# Patient Record
Sex: FEMALE | Race: WHITE | URBAN - METROPOLITAN AREA
[De-identification: names, ages, dates, MRNs, and addresses within clinical notes are randomized per-mention and may not be internally consistent; named-entity substitution may affect disease eponyms.]

---

## 2017-05-06 DIAGNOSIS — E78.5 HYPERLIPIDEMIA, UNSPECIFIED HYPERLIPIDEMIA TYPE: ICD-10-CM

## 2017-05-06 DIAGNOSIS — E55.9 VITAMIN D DEFICIENCY: ICD-10-CM

## 2017-05-06 DIAGNOSIS — I10 HTN (HYPERTENSION), BENIGN: ICD-10-CM

## 2017-05-06 DIAGNOSIS — E11.9 TYPE 2 DIABETES MELLITUS WITHOUT COMPLICATION, WITH LONG-TERM CURRENT USE OF INSULIN (HCC): ICD-10-CM

## 2017-05-06 DIAGNOSIS — Z79.4 TYPE 2 DIABETES MELLITUS WITHOUT COMPLICATION, WITH LONG-TERM CURRENT USE OF INSULIN (HCC): ICD-10-CM

## 2017-05-06 DIAGNOSIS — K21.9 GASTROESOPHAGEAL REFLUX DISEASE WITHOUT ESOPHAGITIS: ICD-10-CM

## 2017-05-08 RX ORDER — ROSUVASTATIN CALCIUM 20 MG/1
TABLET, COATED ORAL
Qty: 30 TAB | Refills: 5 | OUTPATIENT
Start: 2017-05-08

## 2017-05-08 RX ORDER — METFORMIN HYDROCHLORIDE 500 MG/1
TABLET ORAL
Qty: 60 TAB | Refills: 5 | OUTPATIENT
Start: 2017-05-08

## 2017-05-08 RX ORDER — BISOPROLOL FUMARATE AND HYDROCHLOROTHIAZIDE 5; 6.25 MG/1; MG/1
TABLET ORAL
Qty: 60 TAB | Refills: 5 | OUTPATIENT
Start: 2017-05-08

## 2017-05-08 RX ORDER — LOSARTAN POTASSIUM 100 MG/1
TABLET ORAL
Qty: 30 TAB | Refills: 5 | OUTPATIENT
Start: 2017-05-08

## 2017-05-08 NOTE — TELEPHONE ENCOUNTER
Can you let pt know its time for an office visit. It has been 6 months. If she needs any meds to last her until her next visit, let me know.

## 2017-05-08 NOTE — TELEPHONE ENCOUNTER
Spoke with Ms Debbie Dhaliwal to advised that she needs a [facility] before doing a full refill. She has made an appt for 6/1/17 w/Ruthie. Can these medications be refilled for another 30 days. Mrs Debbie Dhaliwal stated that they just lost a 9 wk old granddaughter. She went to sleep and did not wake up. Mrs Debbie Dhaliwal has been out of work dealing with this lost.  She would very much appreciate a temporary refill.

## 2017-05-10 RX ORDER — RABEPRAZOLE SODIUM 20 MG/1
TABLET, DELAYED RELEASE ORAL
Qty: 30 TAB | Refills: 0 | Status: SHIPPED | OUTPATIENT
Start: 2017-05-10 | End: 2017-06-08 | Stop reason: SDUPTHER

## 2017-05-10 RX ORDER — BISOPROLOL FUMARATE AND HYDROCHLOROTHIAZIDE 5; 6.25 MG/1; MG/1
TABLET ORAL
Qty: 60 TAB | Refills: 0 | Status: SHIPPED | OUTPATIENT
Start: 2017-05-10 | End: 2017-06-08 | Stop reason: SDUPTHER

## 2017-05-10 RX ORDER — METFORMIN HYDROCHLORIDE 500 MG/1
TABLET ORAL
Qty: 60 TAB | Refills: 0 | Status: SHIPPED | OUTPATIENT
Start: 2017-05-10 | End: 2017-06-08 | Stop reason: SDUPTHER

## 2017-05-10 RX ORDER — ERGOCALCIFEROL 1.25 MG/1
50000 CAPSULE ORAL
Qty: 12 CAP | Refills: 0 | Status: SHIPPED | OUTPATIENT
Start: 2017-05-10

## 2017-05-10 RX ORDER — MOMETASONE FUROATE 50 UG/1
2 SPRAY, METERED NASAL DAILY
Qty: 1 CONTAINER | Refills: 0 | Status: SHIPPED | OUTPATIENT
Start: 2017-05-10 | End: 2017-12-18 | Stop reason: SDUPTHER

## 2017-05-10 RX ORDER — ROSUVASTATIN CALCIUM 20 MG/1
TABLET, COATED ORAL
Qty: 30 TAB | Refills: 0 | Status: SHIPPED | OUTPATIENT
Start: 2017-05-10 | End: 2017-06-08 | Stop reason: SDUPTHER

## 2017-05-10 RX ORDER — LOSARTAN POTASSIUM 100 MG/1
TABLET ORAL
Qty: 30 TAB | Refills: 0 | Status: SHIPPED | OUTPATIENT
Start: 2017-05-10 | End: 2017-06-08 | Stop reason: SDUPTHER

## 2017-05-30 DIAGNOSIS — E11.9 TYPE 2 DIABETES MELLITUS WITHOUT COMPLICATION, WITH LONG-TERM CURRENT USE OF INSULIN (HCC): ICD-10-CM

## 2017-05-30 DIAGNOSIS — E78.5 HYPERLIPIDEMIA, UNSPECIFIED HYPERLIPIDEMIA TYPE: ICD-10-CM

## 2017-05-30 DIAGNOSIS — Z79.4 TYPE 2 DIABETES MELLITUS WITHOUT COMPLICATION, WITH LONG-TERM CURRENT USE OF INSULIN (HCC): ICD-10-CM

## 2017-05-30 DIAGNOSIS — I10 HTN (HYPERTENSION), BENIGN: ICD-10-CM

## 2017-05-30 RX ORDER — METFORMIN HYDROCHLORIDE 500 MG/1
TABLET ORAL
Qty: 60 TAB | Refills: 0 | Status: CANCELLED | OUTPATIENT
Start: 2017-05-30

## 2017-05-30 RX ORDER — BISOPROLOL FUMARATE AND HYDROCHLOROTHIAZIDE 5; 6.25 MG/1; MG/1
TABLET ORAL
Qty: 60 TAB | Refills: 0 | Status: CANCELLED | OUTPATIENT
Start: 2017-05-30

## 2017-05-30 RX ORDER — LOSARTAN POTASSIUM 100 MG/1
TABLET ORAL
Qty: 30 TAB | Refills: 0 | Status: CANCELLED | OUTPATIENT
Start: 2017-05-30

## 2017-05-30 RX ORDER — ROSUVASTATIN CALCIUM 20 MG/1
TABLET, COATED ORAL
Qty: 30 TAB | Refills: 0 | Status: CANCELLED | OUTPATIENT
Start: 2017-05-30

## 2017-05-30 NOTE — TELEPHONE ENCOUNTER
Pt had to r/s her appt to 6/22/17 as her  had heart attack and triple bypass surgery and she doesn't want to leave him so soon. Pt will need refills before appt.

## 2017-05-31 NOTE — TELEPHONE ENCOUNTER
I refilled her meds for a month on 5/6 and said she was due for an appt. She scheduled for 6/1. Now the appt is change to 6/30. I need to see her for monitoring of her conditions.

## 2017-06-08 ENCOUNTER — OFFICE VISIT (OUTPATIENT)
Dept: INTERNAL MEDICINE CLINIC | Age: 67
End: 2017-06-08

## 2017-06-08 VITALS
WEIGHT: 208 LBS | RESPIRATION RATE: 18 BRPM | HEIGHT: 65 IN | HEART RATE: 84 BPM | OXYGEN SATURATION: 95 % | TEMPERATURE: 98.3 F | DIASTOLIC BLOOD PRESSURE: 76 MMHG | SYSTOLIC BLOOD PRESSURE: 136 MMHG | BODY MASS INDEX: 34.66 KG/M2

## 2017-06-08 DIAGNOSIS — K21.9 GASTROESOPHAGEAL REFLUX DISEASE WITHOUT ESOPHAGITIS: ICD-10-CM

## 2017-06-08 DIAGNOSIS — Z79.4 TYPE 2 DIABETES MELLITUS WITHOUT COMPLICATION, WITH LONG-TERM CURRENT USE OF INSULIN (HCC): Primary | ICD-10-CM

## 2017-06-08 DIAGNOSIS — E78.5 HYPERLIPIDEMIA, UNSPECIFIED HYPERLIPIDEMIA TYPE: ICD-10-CM

## 2017-06-08 DIAGNOSIS — R82.998 LEUKOCYTES IN URINE: ICD-10-CM

## 2017-06-08 DIAGNOSIS — R31.9 BLOOD IN URINE: ICD-10-CM

## 2017-06-08 DIAGNOSIS — E11.9 TYPE 2 DIABETES MELLITUS WITHOUT COMPLICATION, WITH LONG-TERM CURRENT USE OF INSULIN (HCC): Primary | ICD-10-CM

## 2017-06-08 DIAGNOSIS — E55.9 VITAMIN D DEFICIENCY: ICD-10-CM

## 2017-06-08 DIAGNOSIS — I10 HTN (HYPERTENSION), BENIGN: ICD-10-CM

## 2017-06-08 LAB
BILIRUB UR QL STRIP: NORMAL
CREATININE(CRT),U, 723280: 300 MG/DL
GLUCOSE UR-MCNC: NEGATIVE MG/DL
KETONES P FAST UR STRIP-MCNC: NEGATIVE MG/DL
MICROALBUMIN UR TEST STR-MCNC: 80 MG/L
MICROALBUMIN/CREAT RATIO POC: NORMAL MG/G
PH UR STRIP: 5.5 [PH] (ref 4.6–8)
PROT UR QL STRIP: NORMAL MG/DL
SP GR UR STRIP: 1.03 (ref 1–1.03)
UA UROBILINOGEN AMB POC: NORMAL (ref 0.2–1)
URINALYSIS CLARITY POC: CLEAR
URINALYSIS COLOR POC: YELLOW
URINE BLOOD POC: NORMAL
URINE LEUKOCYTES POC: NORMAL
URINE NITRITES POC: NEGATIVE

## 2017-06-08 RX ORDER — METFORMIN HYDROCHLORIDE 500 MG/1
TABLET ORAL
Qty: 60 TAB | Refills: 5 | Status: SHIPPED | OUTPATIENT
Start: 2017-06-08 | End: 2017-12-18 | Stop reason: SDUPTHER

## 2017-06-08 RX ORDER — ROSUVASTATIN CALCIUM 20 MG/1
TABLET, COATED ORAL
Qty: 30 TAB | Refills: 5 | Status: SHIPPED | OUTPATIENT
Start: 2017-06-08 | End: 2017-12-18 | Stop reason: SDUPTHER

## 2017-06-08 RX ORDER — LOSARTAN POTASSIUM 100 MG/1
TABLET ORAL
Qty: 30 TAB | Refills: 5 | Status: SHIPPED | OUTPATIENT
Start: 2017-06-08 | End: 2017-11-27 | Stop reason: SDUPTHER

## 2017-06-08 RX ORDER — RABEPRAZOLE SODIUM 20 MG/1
TABLET, DELAYED RELEASE ORAL
Qty: 30 TAB | Refills: 5 | Status: SHIPPED | OUTPATIENT
Start: 2017-06-08 | End: 2017-12-14 | Stop reason: SDUPTHER

## 2017-06-08 RX ORDER — ERGOCALCIFEROL 1.25 MG/1
50000 CAPSULE ORAL
Qty: 12 CAP | Refills: 5 | Status: CANCELLED | OUTPATIENT
Start: 2017-06-08

## 2017-06-08 RX ORDER — BISOPROLOL FUMARATE AND HYDROCHLOROTHIAZIDE 5; 6.25 MG/1; MG/1
TABLET ORAL
Qty: 60 TAB | Refills: 5 | Status: SHIPPED | OUTPATIENT
Start: 2017-06-08 | End: 2017-12-18 | Stop reason: SDUPTHER

## 2017-06-08 NOTE — PATIENT INSTRUCTIONS
Noninsulin Medicines for Type 2 Diabetes: Care Instructions  Your Care Instructions  There are different types of noninsulin medicines for diabetes. Each works in a different way to help you control your blood sugar. Some types help your body make insulin to lower your blood sugar. Others lower how much insulin your body needs. Some can slow how quickly your body digests sugars. And some can remove extra glucose through your urine. Some of these medicines are:  · Alpha-glucosidase inhibitors. These keep starches from breaking down. This means that they lower the amount of glucose absorbed when you eat. They do not help your body make more insulin. So they will not cause low blood sugar unless they are used with other medicines for diabetes. They include acarbose and miglitol. · DPP-4 inhibitors. These help your body increase the level of insulin after eating. They also help your body make less of a hormone that raises blood sugar. They include linagliptin, saxagliptin, and sitagliptin. · Incretin hormones (GLP-1 receptor agonists). Your body makes a protein that can raise your insulin level, lower your blood sugar, and make you less hungry. You can inject hormone medicines that work the same way as this protein. They include exenatide and liraglutide. · Meglitinides. These help your body release insulin. They also help slow how your body digests sugars. In this way, they prevent your blood sugar from rising too fast after you eat. They include nateglinide and repaglinide. · Metformin. This lowers how much glucose your liver makes. And it helps you respond better to insulin. It also lowers the amount of stored sugar that your liver releases when you are not eating. · Sodium glucose co-transporter 2 inhibitors (SGLT2 inhibitors). These help to remove extra glucose through your urine. They may also help some people lose weight. They include canagliflozin, dapagliflozin, and empagliflozin. · Sulfonylureas. These help your body release more insulin. Some work for many hours and can cause low blood sugar if you don't eat as you expected. They include glipizide and glyburide. · Thiazolidinediones. These reduce the amount of blood glucose. They also help you respond better to insulin. They include pioglitazone and rosiglitazone. You may need to take more than one medicine for diabetes. Two or more medicines may work better to lower your blood sugar level than just one medicine. Follow-up care is a key part of your treatment and safety. Be sure to make and go to all appointments, and call your doctor if you are having problems. It's also a good idea to know your test results and keep a list of the medicines you take. How can you care for yourself at home? · Eat a healthy diet. Get some exercise each day. This may help you to reduce how much medicine you need. · Do not take other prescription or over-the-counter medicines, vitamins, herbal products, or supplements without talking to your doctor first. Some medicines for type 2 diabetes can cause problems with other medicines or supplements. · Tell your doctor if you plan to get pregnant. Some of these drugs are not safe for pregnant women. · Be safe with medicines. Take your medicines exactly as prescribed. Meglitinides or sulfonylureas can cause your blood sugar to drop very low. Call your doctor if you think you are having a problem with your medicine. · Check your blood sugar levels often. You can use a glucose monitor. Keeping track can help you know how certain foods, activities, and medicines affect your blood sugar. And it can help you keep your blood sugar from getting so low that it's not safe. When should you call for help? Call 911 anytime you think you may need emergency care. For example, call if:  · You passed out (lost consciousness), or you suddenly become very sleepy or confused.  (You may have very low blood sugar.)  · You have symptoms of high blood sugar, such as:  ¨ Blurred vision. ¨ Trouble staying awake or being woken up. ¨ Fast, deep breathing. ¨ Breath that smells fruity. ¨ Belly pain, not feeling hungry, and vomiting. ¨ Feeling confused. Call your doctor now or seek immediate medical care if:  · You are sick and cannot control your blood sugar. · You have been vomiting or have had diarrhea for more than 6 hours. · Your blood sugar stays higher than the level your doctor has set for you. · You have symptoms of low blood sugar, such as:  ¨ Sweating. ¨ Feeling nervous, shaky, and weak. ¨ Extreme hunger and slight nausea. ¨ Dizziness and headache. ¨ Blurred vision. ¨ Confusion. Watch closely for changes in your health, and be sure to contact your doctor if:  · You have a hard time knowing when your blood sugar is low. · You have trouble keeping your blood sugar in the target range. · You often have problems controlling your blood sugar. · You have symptoms of long-term diabetes problems, such as:  ¨ New vision changes. ¨ New pain, numbness, or tingling in your hands or feet. ¨ Skin problems. Where can you learn more? Go to http://gregoria-tiffanie.info/. Enter H153 in the search box to learn more about \"Noninsulin Medicines for Type 2 Diabetes: Care Instructions. \"  Current as of: August 3, 2016  Content Version: 11.2  © 2810-6893 [employer]. Care instructions adapted under license by [employer] (which disclaims liability or warranty for this information). If you have questions about a medical condition or this instruction, always ask your healthcare professional. [place] [address] any warranty or liability for your use of this information.

## 2017-06-08 NOTE — MR AVS SNAPSHOT
Visit Information Date & Time Provider Department Dept. Phone Encounter #  
 6/8/2017  8:30 AM Paige Harris PA-C [facility] [phone] [id number] Follow-up Instructions Return in about 6 months (around 12/8/2017) for Combo. Upcoming Health Maintenance Date Due  
 GLAUCOMA SCREENING Q2Y 6/30/2017* OSTEOPOROSIS SCREENING (DEXA) 6/30/2017* BREAST CANCER SCRN MAMMOGRAM 6/30/2017* EYE EXAM RETINAL OR DILATED Q1 6/30/2017* INFLUENZA AGE 9 TO ADULT 8/1/2017 LIPID PANEL Q1 10/17/2017 HEMOGLOBIN A1C Q6M 12/8/2017 FOOT EXAM Q1 6/8/2018 MICROALBUMIN Q1 6/8/2018 MEDICARE YEARLY EXAM 6/9/2018 COLONOSCOPY 10/31/2020 DTaP/Tdap/Td series (2 - Td) 10/10/2022 *Topic was postponed. The date shown is not the original due date. Allergies as of 6/8/2017  Review Complete On: 6/8/2017 By: Paige Harris PA-C No Known Allergies Current Immunizations  Reviewed on 10/10/2012 Name Date Influenza Vaccine 9/21/2015 Influenza Vaccine (Quad) PF 10/17/2016 Influenza Vaccine PF 10/18/2013  9:01 AM  
 Influenza Vaccine Split 10/10/2012, 12/8/2011, 10/26/2010 Pneumococcal Conjugate (PCV-13) 9/21/2015 Pneumococcal Polysaccharide (PPSV-23) 10/17/2016 TDAP Vaccine 10/10/2012 Not reviewed this visit You Were Diagnosed With   
  
 Codes Comments Leukocytes in urine    -  Primary ICD-10-CM: R82.99 
ICD-9-CM: 791.7 Type 2 diabetes mellitus without complication, with long-term current use of insulin (HCC)     ICD-10-CM: E11.9, Z79.4 ICD-9-CM: 250.00, V58.67 Hyperlipidemia, unspecified hyperlipidemia type     ICD-10-CM: E78.5 ICD-9-CM: 272.4 HTN (hypertension), benign     ICD-10-CM: I10 
ICD-9-CM: 401.1 Gastroesophageal reflux disease without esophagitis     ICD-10-CM: K21.9 ICD-9-CM: 530.81 Vitamin D deficiency     ICD-10-CM: E55.9 ICD-9-CM: 268.9 Vitals BP Pulse Temp Resp Height(growth percentile) Weight(growth percentile) 136/76 (BP 1 Location: Left arm, BP Patient Position: Sitting) 84 98.3 °F (36.8 °C) (Oral) 18 5' 5\" (1.651 m) 208 lb (94.3 kg) SpO2 BMI OB Status Smoking Status 95% 34.61 kg/m2 Postmenopausal Former Smoker BMI and BSA Data Body Mass Index Body Surface Area  
 34.61 kg/m 2 2.08 m 2 Preferred Pharmacy Pharmacy Name Phone [facility] [address] [place] [address] [address] [phone] Your Updated Medication List  
  
   
This list is accurate as of: 6/8/17  8:57 AM.  Always use your most recent med list.  
  
  
  
  
 albuterol 90 mcg/actuation inhaler Commonly known as:  PROVENTIL HFA, VENTOLIN HFA, PROAIR HFA Take 2 Puffs by inhalation every six (6) hours as needed for Wheezing. aspirin 81 mg tablet Take 81 mg by mouth. bisoprolol-hydroCHLOROthiazide 5-6.25 mg per tablet Commonly known as:  ZIAC  
take 1 tablet by mouth twice a day CALCIUM 600 + D 600-125 mg-unit Tab Generic drug:  calcium-cholecalciferol (d3) Take 2 Tabs by mouth daily. ergocalciferol 50,000 unit capsule Commonly known as:  ERGOCALCIFEROL Take 1 Cap by mouth every seven (7) days. hydrocortisone valerate 0.2 % topical cream  
Commonly known as:  [employer] Apply  to affected area two (2) times a day. use thin layer  
  
 losartan 100 mg tablet Commonly known as:  COZAAR  
take 1 tablet by mouth once daily  
  
 metFORMIN 500 mg tablet Commonly known as:  GLUCOPHAGE  
take 1 tablet by mouth twice a day with food  
  
 mometasone 50 mcg/actuation nasal spray Commonly known as:  NASONEX  
2 Sprays by Both Nostrils route daily. multivitamin tablet Commonly known as:  ONE A DAY Take 1 Tab by mouth daily. RABEprazole 20 mg tablet Commonly known as:  ACIPHEX  
take 1 tablet by mouth once daily  
  
 rosuvastatin 20 mg tablet Commonly known as:  CRESTOR  
take 1 tablet by mouth once daily Prescriptions Sent to Pharmacy Refills  
 rosuvastatin (CRESTOR) 20 mg tablet 5 Sig: take 1 tablet by mouth once daily Class: Normal  
 Pharmacy: [facility] [place] [address] [address] [facility] [address] Ph #: [phone]  
 losartan (COZAAR) 100 mg tablet 5 Sig: take 1 tablet by mouth once daily Class: Normal  
 Pharmacy: [facility] [place] [address] [address] [facility] [address] Ph #: [phone]  
 metFORMIN (GLUCOPHAGE) 500 mg tablet 5 Sig: take 1 tablet by mouth twice a day with food Class: Normal  
 Pharmacy: [address] Ph #: [phone] RABEprazole (ACIPHEX) 20 mg tablet 5 Sig: take 1 tablet by mouth once daily Class: Normal  
 Pharmacy: [facility] [facility] [address] Ph #: [phone]  
 bisoprolol-hydroCHLOROthiazide (ZIAC) 5-6.25 mg per tablet 5 Sig: take 1 tablet by mouth twice a day Class: Normal  
 Pharmacy: [address] Ph #: [phone] We Performed the Following AMB POC URINALYSIS DIP STICK AUTO W/O MICRO [77383 CPT(R)] AMB POC URINE, MICROALBUMIN, SEMIQUANTITATIVE [33348 CPT(R)]  DIABETES FOOT EXAM [7 Custom] [place] COLLECTION VENOUS BLOOD,VENIPUNCTURE [id number] CPT(R)] Follow-up Instructions Return in about 6 months (around 12/8/2017) for Combo. To-Do List   
 06/08/2017 Microbiology:  CULTURE, URINE   
  
 06/08/2017 Lab:  HEMOGLOBIN A1C WITH EAG   
  
 06/08/2017 Lab:  LIPID PANEL   
  
 06/08/2017 Lab:  METABOLIC PANEL, COMPREHENSIVE   
  
 06/08/2017 Lab:  VITAMIN D, 25 HYDROXY Introducing [facility]! Dear Luis Hdez: Thank you for requesting a [facility] account.   Our records indicate that you already have an active [employer] account. You can access your account anytime at [email or web address] Did you know that you can access your hospital and ER discharge instructions at any time in [employer]? You can also review all of your test results from your hospital stay or ER visit. Additional Information If you have questions, please visit the Frequently Asked Questions section of the [employer] website at [email or web address] Remember, [employer] is NOT to be used for urgent needs. For medical emergencies, dial 911. Now available from your iPhone and Android! Please provide this summary of care documentation to your next provider. Your primary care clinician is listed as Maura Mtz. If you have any questions after today's visit, please call [phone].

## 2017-06-08 NOTE — PROGRESS NOTES
HISTORY OF PRESENT ILLNESS  Екатерина Gregory is a 77 y.o. female. HPI Екатерина Gregory is here for follow up on diabetes, HTN and hypercholesterolemia. She has been trying to change her eating habits and lost 8 lbs since her last visit. She has been under increased stress - her  had a heart attack and she had a 11 week old granddaughter die from suspected SIDS. She herself is doing fine. She has no new complaints and states she feels well. Review of Systems   Constitutional: Negative. HENT: Negative. Eyes: Negative for blurred vision. Respiratory: Negative. Cardiovascular: Negative. Gastrointestinal: Negative for heartburn (has not had significant heartburn sx, has not been taking aciphex). Genitourinary: Negative. Neurological: Negative for tingling. Physical Exam   Constitutional: She is oriented to person, place, and time. She appears well-developed and well-nourished. No distress. HENT:   Head: Normocephalic and atraumatic. Eyes: Conjunctivae are normal.   Cardiovascular: Normal rate and regular rhythm. No murmur heard. Pulmonary/Chest: Effort normal and breath sounds normal. She has no wheezes. Musculoskeletal: She exhibits no edema. Neurological: She is alert and oriented to person, place, and time. Psychiatric: She has a normal mood and affect.  Her behavior is normal. Judgment and thought content normal.     Visit Vitals    /76 (BP 1 Location: Left arm, BP Patient Position: Sitting)    Pulse 84    Temp 98.3 °F (36.8 °C) (Oral)    Resp 18    Ht 5' 5\" (1.651 m)    Wt 208 lb (94.3 kg)    SpO2 95%    BMI 34.61 kg/m2     Wt Readings from Last 3 Encounters:   06/08/17 208 lb (94.3 kg)   10/17/16 216 lb (98 kg)   04/11/16 221 lb (100.2 kg)     Results for orders placed or performed in visit on 06/08/17   AMB POC URINALYSIS DIP STICK AUTO W/O MICRO   Result Value Ref Range    Color (UA POC) Yellow     Clarity (UA POC) Clear     Glucose (UA POC) Negative Negative    Bilirubin (UA POC) 1+ Negative    Ketones (UA POC) Negative Negative    Specific gravity (UA POC) 1.030 1.001 - 1.035    Blood (UA POC) 1+ Negative    pH (UA POC) 5.5 4.6 - 8.0    Protein (UA POC) 2+ Negative mg/dL    Urobilinogen (UA POC) 0.2 mg/dL 0.2 - 1    Nitrites (UA POC) Negative Negative    Leukocyte esterase (UA POC) 1+ Negative   AMB POC URINE, MICROALBUMIN, SEMIQUANTITATIVE   Result Value Ref Range    Microalbumin urine (POC) 80 mg/L    Microalbumin/creat ratio (POC)  mg/g    Creatinine(Crt), urine 300 mg/dL   Diabetic foot exam:     Left: Filament test normal sensation with micro filament   Pulse DP: 2+ (normal)   Pulse PT: 2+ (normal)   Deformities: None  Right: Filament test normal sensation with micro filament   Pulse DP: 2+ (normal)   Pulse PT: 2+ (normal)   Deformities: None          ASSESSMENT and PLAN    ICD-10-CM ICD-9-CM    1. Type 2 diabetes mellitus without complication, with long-term current use of insulin (HCC) E11.9 250.00 AMB POC URINALYSIS DIP STICK AUTO W/O MICRO    Z79.4 V58.67 AMB POC URINE, MICROALBUMIN, SEMIQUANTITATIVE      TX COLLECTION VENOUS BLOOD,VENIPUNCTURE      METABOLIC PANEL, COMPREHENSIVE      LIPID PANEL      HEMOGLOBIN A1C WITH EAG      HM DIABETES FOOT EXAM      metFORMIN (GLUCOPHAGE) 500 mg tablet      METABOLIC PANEL, COMPREHENSIVE      LIPID PANEL      HEMOGLOBIN A1C WITH EAG   2. Hyperlipidemia, unspecified hyperlipidemia type E78.5 272.4 rosuvastatin (CRESTOR) 20 mg tablet   3. HTN (hypertension), benign I10 401.1 losartan (COZAAR) 100 mg tablet      bisoprolol-hydroCHLOROthiazide (ZIAC) 5-6.25 mg per tablet   4. Gastroesophageal reflux disease without esophagitis K21.9 530.81 RABEprazole (ACIPHEX) 20 mg tablet   5. Vitamin D deficiency E55.9 268.9 VITAMIN D, 25 HYDROXY      VITAMIN D, 25 HYDROXY   6. Leukocytes in urine R82.99 791.7 CULTURE, URINE      CULTURE, URINE   7.  Blood in urine R31.9 599.70 There is not enough urine for the UA microscopic,  urine culture is negative, will have pt return for repeat UA     Pt verbalized understanding of their condition and diagnoses, treatment plan,  as well as side effects of any new medications prescribed.

## 2017-06-08 NOTE — PROGRESS NOTES
Chief Complaint   Patient presents with    Diabetes    Hypertension    Cholesterol Problem    Vitamin D Deficiency     1. Have you been to the ER, urgent care clinic since your last visit? Hospitalized since your last visit? No    2. Have you seen or consulted any other health care providers outside of the [facility] since your last visit? Include any pap smears or colon screening. No       Pt wished to schedule her own appt for mammogram she said she can just call also states she canceled her eye appt because she has to take care of her . Will reschedule when she gets a chance.

## 2017-06-10 LAB
25(OH)D3 SERPL-MCNC: 33.3 NG/ML (ref 32–100)
A-G RATIO,AGRAT: 2 RATIO (ref 1.1–2.6)
ALBUMIN SERPL-MCNC: 4.5 G/DL (ref 3.5–5)
ALP SERPL-CCNC: 78 U/L (ref 40–120)
ALT SERPL-CCNC: 27 U/L (ref 5–40)
ANION GAP SERPL CALC-SCNC: 17 MMOL/L
AST SERPL W P-5'-P-CCNC: 22 U/L (ref 10–37)
AVG GLU, 10930: 131 MG/DL (ref 91–123)
BILIRUB SERPL-MCNC: 0.6 MG/DL (ref 0.2–1.2)
BUN SERPL-MCNC: 10 MG/DL (ref 6–22)
CALCIUM SERPL-MCNC: 9.5 MG/DL (ref 8.4–10.5)
CHLORIDE SERPL-SCNC: 99 MMOL/L (ref 98–110)
CHOLEST SERPL-MCNC: 115 MG/DL (ref 110–200)
CO2 SERPL-SCNC: 22 MMOL/L (ref 20–32)
CREAT SERPL-MCNC: 0.7 MG/DL (ref 0.8–1.4)
CULTURE RESULT, SENTARA: NORMAL
GFRAA, 66117: >60
GFRNA, 66118: >60
GLOBULIN,GLOB: 2.2 G/DL (ref 2–4)
GLUCOSE SERPL-MCNC: 114 MG/DL (ref 65–99)
HBA1C MFR BLD HPLC: 6.2 % (ref 4.8–5.9)
HDLC SERPL-MCNC: 47 MG/DL (ref 40–59)
LDLC SERPL CALC-MCNC: 46 MG/DL (ref 50–99)
POTASSIUM SERPL-SCNC: 3.9 MMOL/L (ref 3.5–5.5)
PROT SERPL-MCNC: 6.7 G/DL (ref 6.2–8.1)
SODIUM SERPL-SCNC: 138 MMOL/L (ref 133–145)
TRIGL SERPL-MCNC: 110 MG/DL (ref 40–149)
VLDLC SERPL CALC-MCNC: 22 MG/DL (ref 8–30)

## 2017-11-27 DIAGNOSIS — I10 HTN (HYPERTENSION), BENIGN: ICD-10-CM

## 2017-11-27 RX ORDER — LOSARTAN POTASSIUM 100 MG/1
TABLET ORAL
Qty: 30 TAB | Refills: 0 | Status: SHIPPED | OUTPATIENT
Start: 2017-11-27 | End: 2017-12-18 | Stop reason: SDUPTHER

## 2017-12-02 DIAGNOSIS — E78.5 HYPERLIPIDEMIA, UNSPECIFIED HYPERLIPIDEMIA TYPE: ICD-10-CM

## 2017-12-02 DIAGNOSIS — Z79.4 TYPE 2 DIABETES MELLITUS WITHOUT COMPLICATION, WITH LONG-TERM CURRENT USE OF INSULIN (HCC): ICD-10-CM

## 2017-12-02 DIAGNOSIS — I10 HTN (HYPERTENSION), BENIGN: ICD-10-CM

## 2017-12-02 DIAGNOSIS — E11.9 TYPE 2 DIABETES MELLITUS WITHOUT COMPLICATION, WITH LONG-TERM CURRENT USE OF INSULIN (HCC): ICD-10-CM

## 2017-12-04 RX ORDER — METFORMIN HYDROCHLORIDE 500 MG/1
TABLET ORAL
Qty: 60 TAB | Refills: 5 | OUTPATIENT
Start: 2017-12-04

## 2017-12-04 RX ORDER — ROSUVASTATIN CALCIUM 20 MG/1
TABLET, COATED ORAL
Qty: 30 TAB | Refills: 5 | OUTPATIENT
Start: 2017-12-04

## 2017-12-04 RX ORDER — LOSARTAN POTASSIUM 100 MG/1
TABLET ORAL
Qty: 30 TAB | Refills: 0 | OUTPATIENT
Start: 2017-12-04

## 2017-12-04 RX ORDER — BISOPROLOL FUMARATE AND HYDROCHLOROTHIAZIDE 5; 6.25 MG/1; MG/1
TABLET ORAL
Qty: 60 TAB | Refills: 5 | OUTPATIENT
Start: 2017-12-04

## 2017-12-06 NOTE — TELEPHONE ENCOUNTER
Tried calling pt lm.  Sending pt a letter letting her know its time for her appt     Closing encounter

## 2017-12-14 ENCOUNTER — LAB ONLY (OUTPATIENT)
Dept: INTERNAL MEDICINE CLINIC | Age: 67
End: 2017-12-14

## 2017-12-14 DIAGNOSIS — I10 HTN (HYPERTENSION), BENIGN: ICD-10-CM

## 2017-12-14 DIAGNOSIS — E11.9 TYPE 2 DIABETES MELLITUS WITHOUT COMPLICATION, WITHOUT LONG-TERM CURRENT USE OF INSULIN (HCC): ICD-10-CM

## 2017-12-14 DIAGNOSIS — E78.5 HYPERLIPIDEMIA, UNSPECIFIED HYPERLIPIDEMIA TYPE: Primary | ICD-10-CM

## 2017-12-14 DIAGNOSIS — K21.9 GASTROESOPHAGEAL REFLUX DISEASE WITHOUT ESOPHAGITIS: ICD-10-CM

## 2017-12-14 DIAGNOSIS — E55.9 VITAMIN D DEFICIENCY: ICD-10-CM

## 2017-12-15 LAB
25(OH)D3 SERPL-MCNC: 37 NG/ML (ref 32–100)
A-G RATIO,AGRAT: 1.9 RATIO (ref 1.1–2.6)
ALBUMIN SERPL-MCNC: 4.4 G/DL (ref 3.5–5)
ALP SERPL-CCNC: 83 U/L (ref 40–120)
ALT SERPL-CCNC: 19 U/L (ref 5–40)
ANION GAP SERPL CALC-SCNC: 16 MMOL/L
AST SERPL W P-5'-P-CCNC: 22 U/L (ref 10–37)
AVG GLU, 10930: 130 MG/DL (ref 91–123)
BILIRUB SERPL-MCNC: 0.5 MG/DL (ref 0.2–1.2)
BUN SERPL-MCNC: 16 MG/DL (ref 6–22)
CALCIUM SERPL-MCNC: 9.6 MG/DL (ref 8.4–10.5)
CHLORIDE SERPL-SCNC: 98 MMOL/L (ref 98–110)
CHOLEST SERPL-MCNC: 130 MG/DL (ref 110–200)
CO2 SERPL-SCNC: 26 MMOL/L (ref 20–32)
CREAT SERPL-MCNC: 0.8 MG/DL (ref 0.8–1.4)
GFRAA, 66117: >60
GFRNA, 66118: >60
GLOBULIN,GLOB: 2.3 G/DL (ref 2–4)
GLUCOSE SERPL-MCNC: 110 MG/DL (ref 70–99)
HBA1C MFR BLD HPLC: 6.2 % (ref 4.8–5.9)
HDLC SERPL-MCNC: 54 MG/DL (ref 40–59)
LDLC SERPL CALC-MCNC: 57 MG/DL (ref 50–99)
POTASSIUM SERPL-SCNC: 4.4 MMOL/L (ref 3.5–5.5)
PROT SERPL-MCNC: 6.7 G/DL (ref 6.2–8.1)
SODIUM SERPL-SCNC: 140 MMOL/L (ref 133–145)
TRIGL SERPL-MCNC: 99 MG/DL (ref 40–149)
VLDLC SERPL CALC-MCNC: 20 MG/DL (ref 8–30)

## 2017-12-15 RX ORDER — RABEPRAZOLE SODIUM 20 MG/1
TABLET, DELAYED RELEASE ORAL
Qty: 30 TAB | Refills: 5 | Status: SHIPPED | OUTPATIENT
Start: 2017-12-15 | End: 2017-12-18

## 2017-12-18 ENCOUNTER — OFFICE VISIT (OUTPATIENT)
Dept: INTERNAL MEDICINE CLINIC | Age: 67
End: 2017-12-18

## 2017-12-18 VITALS
RESPIRATION RATE: 18 BRPM | OXYGEN SATURATION: 97 % | SYSTOLIC BLOOD PRESSURE: 112 MMHG | WEIGHT: 212 LBS | DIASTOLIC BLOOD PRESSURE: 68 MMHG | BODY MASS INDEX: 35.32 KG/M2 | HEART RATE: 77 BPM | HEIGHT: 65 IN | TEMPERATURE: 98.6 F

## 2017-12-18 DIAGNOSIS — I10 HTN (HYPERTENSION), BENIGN: ICD-10-CM

## 2017-12-18 DIAGNOSIS — E11.9 TYPE 2 DIABETES MELLITUS WITHOUT COMPLICATION, WITH LONG-TERM CURRENT USE OF INSULIN (HCC): Primary | ICD-10-CM

## 2017-12-18 DIAGNOSIS — Z23 ENCOUNTER FOR IMMUNIZATION: ICD-10-CM

## 2017-12-18 DIAGNOSIS — Z79.4 TYPE 2 DIABETES MELLITUS WITHOUT COMPLICATION, WITH LONG-TERM CURRENT USE OF INSULIN (HCC): Primary | ICD-10-CM

## 2017-12-18 DIAGNOSIS — E78.5 HYPERLIPIDEMIA, UNSPECIFIED HYPERLIPIDEMIA TYPE: ICD-10-CM

## 2017-12-18 DIAGNOSIS — Z91.09 POLLEN ALLERGIES: ICD-10-CM

## 2017-12-18 RX ORDER — MOMETASONE FUROATE 50 UG/1
2 SPRAY, METERED NASAL DAILY
Qty: 1 CONTAINER | Refills: 5 | Status: SHIPPED | OUTPATIENT
Start: 2017-12-18 | End: 2018-07-16 | Stop reason: SDUPTHER

## 2017-12-18 RX ORDER — BISOPROLOL FUMARATE AND HYDROCHLOROTHIAZIDE 5; 6.25 MG/1; MG/1
TABLET ORAL
Qty: 60 TAB | Refills: 5 | Status: SHIPPED | OUTPATIENT
Start: 2017-12-18 | End: 2018-05-07 | Stop reason: SDUPTHER

## 2017-12-18 RX ORDER — LANCETS
EACH MISCELLANEOUS
Qty: 100 EACH | Refills: 11 | Status: SHIPPED | OUTPATIENT
Start: 2017-12-18

## 2017-12-18 RX ORDER — LOSARTAN POTASSIUM 100 MG/1
TABLET ORAL
Qty: 30 TAB | Refills: 5 | Status: SHIPPED | OUTPATIENT
Start: 2017-12-18 | End: 2018-07-09 | Stop reason: SDUPTHER

## 2017-12-18 RX ORDER — ALBUTEROL SULFATE 90 UG/1
2 AEROSOL, METERED RESPIRATORY (INHALATION)
Qty: 1 INHALER | Refills: 5 | Status: SHIPPED | OUTPATIENT
Start: 2017-12-18

## 2017-12-18 RX ORDER — METFORMIN HYDROCHLORIDE 500 MG/1
TABLET ORAL
Qty: 60 TAB | Refills: 5 | Status: SHIPPED | OUTPATIENT
Start: 2017-12-18 | End: 2018-05-07 | Stop reason: SDUPTHER

## 2017-12-18 RX ORDER — INSULIN PUMP SYRINGE, 3 ML
EACH MISCELLANEOUS
Qty: 1 KIT | Refills: 0 | Status: SHIPPED | OUTPATIENT
Start: 2017-12-18 | End: 2018-07-16 | Stop reason: ALTCHOICE

## 2017-12-18 RX ORDER — ROSUVASTATIN CALCIUM 20 MG/1
TABLET, COATED ORAL
Qty: 30 TAB | Refills: 5 | Status: SHIPPED | OUTPATIENT
Start: 2017-12-18 | End: 2018-05-07 | Stop reason: SDUPTHER

## 2017-12-18 NOTE — PROGRESS NOTES
HISTORY OF PRESENT ILLNESS  Horace Delaney is a 79 y.o. female. HPI Horace Delaney is here for follow up on diabetes, HTN and hypercholesterolemia. She does not monitor her sugar. She was using her 's meter, but it is 8 years old. Will prescribe her a new one. She denies blurred vision, chest pain, shortness of breath or leg swelling. She does not feel that she routinely needs her inhaler. Review of Systems   Constitutional: Negative. HENT: Negative. Respiratory: Positive for wheezing (occasional and uses albuterol prn). Negative for cough and shortness of breath. Cardiovascular: Negative for chest pain and leg swelling. Gastrointestinal: Negative. Neurological: Negative for dizziness and tingling. Physical Exam   Constitutional: She is oriented to person, place, and time. She appears well-developed and well-nourished. No distress. HENT:   Head: Normocephalic and atraumatic. Eyes: Conjunctivae are normal.   Cardiovascular: Normal rate and regular rhythm. Pulmonary/Chest: Effort normal. She has no wheezes. Musculoskeletal: She exhibits no edema. Neurological: She is alert and oriented to person, place, and time. Psychiatric: She has a normal mood and affect. Her behavior is normal. Judgment and thought content normal.     Visit Vitals    /68 (BP 1 Location: Left arm, BP Patient Position: Sitting)    Pulse 77    Temp 98.6 °F (37 °C) (Oral)    Resp 18    Ht 5' 5\" (1.651 m)    Wt 212 lb (96.2 kg)    SpO2 97%    BMI 35.28 kg/m2     Wt Readings from Last 3 Encounters:   12/18/17 212 lb (96.2 kg)   06/08/17 208 lb (94.3 kg)   10/17/16 216 lb (98 kg)         ASSESSMENT and PLAN    ICD-10-CM ICD-9-CM    1.  Type 2 diabetes mellitus without complication, with long-term current use of insulin ([facility]) E11.9 250.00 metFORMIN (GLUCOPHAGE) 500 mg tablet    Z79.4 V58.67 Blood-Glucose Meter (CONTOUR METER) monitoring kit      glucose blood VI test strips (ASCENSIA CONTOUR) strip      Lancets misc   2. HTN (hypertension), benign I10 401.1 bisoprolol-hydroCHLOROthiazide (ZIAC) 5-6.25 mg per tablet      losartan (COZAAR) 100 mg tablet   3. Hyperlipidemia, unspecified hyperlipidemia type E78.5 272.4 rosuvastatin (CRESTOR) 20 mg tablet   4. Pollen allergies J30.1 477.0 albuterol (PROVENTIL HFA, VENTOLIN HFA, PROAIR HFA) 90 mcg/actuation inhaler   5. Encounter for immunization Z23 V03.89 INFLUENZA VIRUS VACCINE, HIGH DOSE SEASONAL, PRESERVATIVE FREE   information was provided to her regarding blood glucose testing as well as understanding A1c and testing guidelines. Pt verbalized understanding of their condition and diagnoses, treatment plan,  as well as side effects of any new medications prescribed.

## 2017-12-18 NOTE — PATIENT INSTRUCTIONS
Type 2 Diabetes: Care Instructions  Your Care Instructions    Type 2 diabetes is a disease that develops when the body's tissues cannot use insulin properly. Over time, the pancreas cannot make enough insulin. Insulin is a hormone that helps the body's cells use sugar (glucose) for energy. It also helps the body store extra sugar in muscle, fat, and liver cells. Without insulin, the sugar cannot get into the cells to do its work. It stays in the blood instead. This can cause high blood sugar levels. A person has diabetes when the blood sugar stays too high too much of the time. Over time, diabetes can lead to diseases of the heart, blood vessels, nerves, kidneys, and eyes. You may be able to control your blood sugar by losing weight, eating a healthy diet, and getting daily exercise. You may also have to take insulin or other diabetes medicine. Follow-up care is a key part of your treatment and safety. Be sure to make and go to all appointments. Call your doctor if you are having problems. It's also a good idea to know your test results and keep a list of the medicines you take. How can you care for yourself at home? · Keep your blood sugar at a target level (which you set with your doctor). ¨ Eat a good diet that spreads carbohydrate throughout the day. Carbohydrate-the body's main source of fuel-affects blood sugar more than any other nutrient. Carbohydrate is in fruits, vegetables, milk, and yogurt. It also is in breads, cereals, vegetables such as potatoes and corn, and sugary foods such as candy and cakes. ¨ Aim for 30 minutes of exercise on most, preferably all, days of the week. Walking is a good choice. You also may want to do other activities, such as running, swimming, cycling, or playing tennis or team sports. If your doctor says it's okay, do muscle-strengthening exercises at least 2 times a week. ¨ Take your medicines exactly as prescribed.  Call your doctor if you think you are having a problem with your medicine. You will get more details on the specific medicines your doctor prescribes. · Check your blood sugar as often as your doctor recommends. It is important to keep track of any symptoms you have, such as low blood sugar. Also tell your doctor if you have any changes in your activities, diet, or insulin use. · Talk to your doctor before you start taking aspirin every day. Aspirin can help certain people lower their risk of a heart attack or stroke. But taking aspirin isn't right for everyone, because it can cause serious bleeding. · Do not smoke. If you need help quitting, talk to your doctor about stop-smoking programs and medicines. These can increase your chances of quitting for good. · Keep your cholesterol and blood pressure at normal levels. You may need to take one or more medicines to reach your goals. Take them exactly as directed. Do not stop or change a medicine without talking to your doctor first.  When should you call for help? Call 911 anytime you think you may need emergency care. For example, call if:  ? · You passed out (lost consciousness), or you suddenly become very sleepy or confused. (You may have very low blood sugar.)   ? Call your doctor now or seek immediate medical care if:  ? · Your blood sugar is 300 mg/dL or is higher than the level your doctor has set for you. ? · You have symptoms of low blood sugar, such as:  ¨ Sweating. ¨ Feeling nervous, shaky, and weak. ¨ Extreme hunger and slight nausea. ¨ Dizziness and headache. ¨ Blurred vision. ¨ Confusion. ? Watch closely for changes in your health, and be sure to contact your doctor if:  ? · You often have problems controlling your blood sugar. ? · You have symptoms of long-term diabetes problems, such as:  ¨ New vision changes. ¨ New pain, numbness, or tingling in your hands or feet. ¨ Skin problems. Where can you learn more? Go to http://gregoria-tiffanie.info/.   Enter C553 in the search box to learn more about \"Type 2 Diabetes: Care Instructions. \"  Current as of: March 13, 2017  Content Version: 11.4  © 0553-7051 [employer]. Care instructions adapted under license by [employer] (which disclaims liability or warranty for this information). If you have questions about a medical condition or this instruction, always ask your healthcare professional. [place] [address] any warranty or liability for your use of this information.

## 2018-05-07 DIAGNOSIS — Z79.4 TYPE 2 DIABETES MELLITUS WITHOUT COMPLICATION, WITH LONG-TERM CURRENT USE OF INSULIN (HCC): ICD-10-CM

## 2018-05-07 DIAGNOSIS — I10 HTN (HYPERTENSION), BENIGN: ICD-10-CM

## 2018-05-07 DIAGNOSIS — E78.5 HYPERLIPIDEMIA, UNSPECIFIED HYPERLIPIDEMIA TYPE: ICD-10-CM

## 2018-05-07 DIAGNOSIS — E11.9 TYPE 2 DIABETES MELLITUS WITHOUT COMPLICATION, WITH LONG-TERM CURRENT USE OF INSULIN (HCC): ICD-10-CM

## 2018-05-07 RX ORDER — METFORMIN HYDROCHLORIDE 500 MG/1
TABLET ORAL
Qty: 60 TAB | Refills: 0 | Status: SHIPPED | OUTPATIENT
Start: 2018-05-07 | End: 2018-07-09 | Stop reason: SDUPTHER

## 2018-05-07 RX ORDER — ROSUVASTATIN CALCIUM 20 MG/1
TABLET, COATED ORAL
Qty: 30 TAB | Refills: 5 | Status: SHIPPED | OUTPATIENT
Start: 2018-05-07 | End: 2018-07-16 | Stop reason: SDUPTHER

## 2018-05-07 RX ORDER — BISOPROLOL FUMARATE AND HYDROCHLOROTHIAZIDE 5; 6.25 MG/1; MG/1
TABLET ORAL
Qty: 60 TAB | Refills: 0 | Status: SHIPPED | OUTPATIENT
Start: 2018-05-07 | End: 2018-07-09 | Stop reason: SDUPTHER

## 2018-06-30 DIAGNOSIS — Z79.4 TYPE 2 DIABETES MELLITUS WITHOUT COMPLICATION, WITH LONG-TERM CURRENT USE OF INSULIN (HCC): ICD-10-CM

## 2018-06-30 DIAGNOSIS — I10 HTN (HYPERTENSION), BENIGN: ICD-10-CM

## 2018-06-30 DIAGNOSIS — E11.9 TYPE 2 DIABETES MELLITUS WITHOUT COMPLICATION, WITH LONG-TERM CURRENT USE OF INSULIN (HCC): ICD-10-CM

## 2018-07-09 RX ORDER — BISOPROLOL FUMARATE AND HYDROCHLOROTHIAZIDE 5; 6.25 MG/1; MG/1
TABLET ORAL
Qty: 60 TAB | Refills: 0 | Status: SHIPPED | COMMUNITY
Start: 2018-07-09 | End: 2018-07-16 | Stop reason: SDUPTHER

## 2018-07-09 RX ORDER — LOSARTAN POTASSIUM 100 MG/1
TABLET ORAL
Qty: 30 TAB | Refills: 5 | Status: SHIPPED | COMMUNITY
Start: 2018-07-09 | End: 2018-07-16 | Stop reason: SDUPTHER

## 2018-07-09 RX ORDER — METFORMIN HYDROCHLORIDE 500 MG/1
TABLET ORAL
Qty: 60 TAB | Refills: 0 | Status: SHIPPED | COMMUNITY
Start: 2018-07-09 | End: 2018-07-16 | Stop reason: SDUPTHER

## 2018-07-16 ENCOUNTER — OFFICE VISIT (OUTPATIENT)
Dept: INTERNAL MEDICINE CLINIC | Age: 68
End: 2018-07-16

## 2018-07-16 VITALS
SYSTOLIC BLOOD PRESSURE: 130 MMHG | TEMPERATURE: 98.4 F | HEART RATE: 64 BPM | RESPIRATION RATE: 18 BRPM | WEIGHT: 222 LBS | HEIGHT: 65 IN | OXYGEN SATURATION: 97 % | BODY MASS INDEX: 36.99 KG/M2 | DIASTOLIC BLOOD PRESSURE: 78 MMHG

## 2018-07-16 DIAGNOSIS — E11.9 TYPE 2 DIABETES MELLITUS WITHOUT COMPLICATION, WITH LONG-TERM CURRENT USE OF INSULIN (HCC): Primary | ICD-10-CM

## 2018-07-16 DIAGNOSIS — E78.5 HYPERLIPIDEMIA, UNSPECIFIED HYPERLIPIDEMIA TYPE: ICD-10-CM

## 2018-07-16 DIAGNOSIS — E66.01 SEVERE OBESITY (BMI 35.0-39.9): ICD-10-CM

## 2018-07-16 DIAGNOSIS — E55.9 VITAMIN D DEFICIENCY: ICD-10-CM

## 2018-07-16 DIAGNOSIS — Z79.4 TYPE 2 DIABETES MELLITUS WITHOUT COMPLICATION, WITH LONG-TERM CURRENT USE OF INSULIN (HCC): Primary | ICD-10-CM

## 2018-07-16 DIAGNOSIS — I10 HTN (HYPERTENSION), BENIGN: ICD-10-CM

## 2018-07-16 DIAGNOSIS — Z88.9 H/O SEASONAL ALLERGIES: ICD-10-CM

## 2018-07-16 LAB
Lab: 50 MG/DL
MICROALBUMIN UR TEST STR-MCNC: 10 MG/L
MICROALBUMIN/CREAT RATIO POC: NORMAL MG/G
SOURCE POCT, SRCEPOCT: NORMAL

## 2018-07-16 RX ORDER — METFORMIN HYDROCHLORIDE 500 MG/1
TABLET ORAL
Qty: 60 TAB | Refills: 5 | Status: SHIPPED | OUTPATIENT
Start: 2018-07-16 | End: 2019-01-04 | Stop reason: SDUPTHER

## 2018-07-16 RX ORDER — MOMETASONE FUROATE 50 UG/1
2 SPRAY, METERED NASAL DAILY
Qty: 1 CONTAINER | Refills: 5 | Status: SHIPPED | OUTPATIENT
Start: 2018-07-16

## 2018-07-16 RX ORDER — BISOPROLOL FUMARATE AND HYDROCHLOROTHIAZIDE 5; 6.25 MG/1; MG/1
TABLET ORAL
Qty: 60 TAB | Refills: 5 | Status: SHIPPED | OUTPATIENT
Start: 2018-07-16 | End: 2019-01-04 | Stop reason: SDUPTHER

## 2018-07-16 RX ORDER — ROSUVASTATIN CALCIUM 20 MG/1
TABLET, COATED ORAL
Qty: 30 TAB | Refills: 5 | Status: SHIPPED | OUTPATIENT
Start: 2018-07-16 | End: 2019-01-30 | Stop reason: SDUPTHER

## 2018-07-16 RX ORDER — INSULIN PUMP SYRINGE, 3 ML
EACH MISCELLANEOUS
Qty: 1 KIT | Refills: 0 | Status: SHIPPED | OUTPATIENT
Start: 2018-07-16 | End: 2018-08-10 | Stop reason: SDUPTHER

## 2018-07-16 RX ORDER — LOSARTAN POTASSIUM 100 MG/1
TABLET ORAL
Qty: 30 TAB | Refills: 5 | Status: SHIPPED | OUTPATIENT
Start: 2018-07-16 | End: 2019-01-30 | Stop reason: SDUPTHER

## 2018-07-16 NOTE — PROGRESS NOTES
HISTORY OF PRESENT ILLNESS  Taylor Flores is a 76 y.o. female. HPI Taylor Flores is here for follow up on diabetes, HTN and hypercholesterolemia. She has not been checking her blood sugar. I did send an order for glucometer and supplies in at her last visit, but it does not appear she got it. I advised her if she does not get one this time, to check with [employer] insurance to see if the supplies need to be obtained from a diabetic supply company. Review of Systems   HENT: Negative. Eyes: Negative. Respiratory: Negative. Cardiovascular: Negative. Gastrointestinal: Negative. Genitourinary: Negative. Musculoskeletal: Negative. Neurological: Negative. Endo/Heme/Allergies: Positive for environmental allergies. Psychiatric/Behavioral: Negative. Physical Exam   Constitutional: She is oriented to person, place, and time. She appears well-developed and well-nourished. No distress. HENT:   Head: Normocephalic. Eyes: Conjunctivae are normal.   Cardiovascular: Normal rate and regular rhythm. No murmur heard. Pulmonary/Chest: Effort normal and breath sounds normal. She has no wheezes. Musculoskeletal: She exhibits no edema. Neurological: She is alert and oriented to person, place, and time. Psychiatric: She has a normal mood and affect. Her behavior is normal. Judgment and thought content normal.     Visit Vitals    /78 (BP 1 Location: Left arm, BP Patient Position: Sitting)    Pulse 64    Temp 98.4 °F (36.9 °C) (Oral)    Resp 18    Ht 5' 5\" (1.651 m)    Wt 222 lb (100.7 kg)    SpO2 97%    BMI 36.94 kg/m2     Wt Readings from Last 3 Encounters:   07/16/18 222 lb (100.7 kg)   12/18/17 212 lb (96.2 kg)   06/08/17 208 lb (94.3 kg)   Admits to not watching what she is eating. Info provided on a diabetic diet and portion management. ASSESSMENT and PLAN    ICD-10-CM ICD-9-CM    1.  Type 2 diabetes mellitus without complication, with long-term current use of insulin ([facility]) P54.8 436.11 METABOLIC PANEL, COMPREHENSIVE    Z79.4 V58.67 LIPID PANEL      HEMOGLOBIN A1C WITH EAG      AMB POC URINE, MICROALBUMIN, SEMIQUANTITATIVE      AMB POC CREATININE ASSAY, OTHER SOURCE      metFORMIN (GLUCOPHAGE) 500 mg tablet      Blood-Glucose Meter (FREESTYLE FREEDOM LITE) monitoring kit      TSH 3RD GENERATION   2. HTN (hypertension), benign I10 401.1 bisoprolol-hydroCHLOROthiazide (ZIAC) 5-6.25 mg per tablet      losartan (COZAAR) 100 mg tablet   3. Hyperlipidemia, unspecified hyperlipidemia type E78.5 272.4 LIPID PANEL      rosuvastatin (CRESTOR) 20 mg tablet   4. Vitamin D deficiency E55.9 268.9 VITAMIN D, 25 HYDROXY   5. H/O seasonal allergies Z88.9 V15.09 mometasone (NASONEX) 50 mcg/actuation nasal spray   6. Severe obesity (BMI 35.0-39.9) ([facility]) E66.01 278.01 TSH 3RD GENERATION. Recommend increase exercise, diet and weight reduction       Pt verbalized understanding of their condition and diagnoses, treatment plan,  as well as side effects of any new medications prescribed.

## 2018-07-16 NOTE — PATIENT INSTRUCTIONS
Counting Carbohydrates: Care Instructions Your Care Instructions You don't have to eat special foods when you have diabetes. You just have to be careful to eat healthy foods. Carbohydrates (carbs) raise blood sugar higher and quicker than any other nutrient. Carbs are found in desserts, breads and cereals, and fruit. They're also in starchy vegetables. These include potatoes, corn, and grains such as rice and pasta. Carbs are also in milk and yogurt. The more carbs you eat at one time, the higher your blood sugar will rise. Spreading carbs all through the day helps keep your blood sugar levels within your target range. Counting carbs is one of the best ways to keep your blood sugar under control. If you use insulin, counting carbs helps you match the right amount of insulin to the number of grams of carbs in a meal. Then you can change your diet and insulin dose as needed. Testing your blood sugar several times a day can help you learn how carbs affect your blood sugar. A registered dietitian or certified diabetes educator can help you plan meals and snacks. Follow-up care is a key part of your treatment and safety. Be sure to make and go to all appointments, and call your doctor if you are having problems. It's also a good idea to know your test results and keep a list of the medicines you take. How can you care for yourself at home? Know your daily amount of carbohydrates Your daily amount depends on several things, such as your weight, how active you are, which diabetes medicines you take, and what your goals are for your blood sugar levels. A registered dietitian or certified diabetes educator can help you plan how many carbs to include in each meal and snack. For most adults, a guideline for the daily amount of carbs is: · 45 to 60 grams at each meal. That's about the same as 3 to 4 carbohydrate servings. · 15 to 20 grams at each snack. That's about the same as 1 carbohydrate serving.  
Count carbs Counting carbs lets you know how much rapid-acting insulin to take before you eat. If you use an insulin pump, you get a constant rate of insulin during the day. So the pump must be programmed at meals. This gives you extra insulin to cover the rise in blood sugar after meals. If you take insulin: 
· Learn your own insulin-to-carb ratio. You and your diabetes health professional will figure out the ratio. You can do this by testing your blood sugar after meals. For example, you may need a certain amount of insulin for every 15 grams of carbs. · Add up the carb grams in a meal. Then you can figure out how many units of insulin to take based on your insulin-to-carb ratio. · Exercise lowers blood sugar. You can use less insulin than you would if you were not doing exercise. Keep in mind that timing matters. If you exercise within 1 hour after a meal, your body may need less insulin for that meal than it would if you exercised 3 hours after the meal. Test your blood sugar to find out how exercise affects your need for insulin. If you do or don't take insulin: 
· Look at labels on packaged foods. This can tell you how many carbs are in a serving. You can also use guides from the American Diabetes Association. · Be aware of portions, or serving sizes. If a package has two servings and you eat the whole package, you need to double the number of grams of carbohydrate listed for one serving. · Protein, fat, and fiber do not raise blood sugar as much as carbs do. If you eat a lot of these nutrients in a meal, your blood sugar will rise more slowly than it would otherwise. Eat from all food groups · Eat at least three meals a day. · Plan meals to include food from all the food groups. The food groups include grains, fruits, dairy, proteins, and vegetables. · Talk to your dietitian or diabetes educator about ways to add limited amounts of sweets into your meal plan. · If you drink alcohol, talk to your doctor. It may not be recommended when you are taking certain diabetes medicines. Where can you learn more? Go to http://gregoria-tiffanie.info/. Enter L592 in the search box to learn more about \"Counting Carbohydrates: Care Instructions. \" Current as of: December 7, 2017 Content Version: 11.7 © 1676-0084 [employer]. Care instructions adapted under license by [employer] (which disclaims liability or warranty for this information). If you have questions about a medical condition or this instruction, always ask your healthcare professional. [address] any warranty or liability for your use of this information.

## 2018-07-16 NOTE — PROGRESS NOTES
Chief Complaint   Patient presents with    Diabetes    Hypertension    Cholesterol Problem    Vitamin D Deficiency     1. Have you been to the ER, urgent care clinic since your last visit? Hospitalized since your last visit? No    2. Have you seen or consulted any other health care providers outside of the [address] [place] [address] since your last visit? Include any pap smears or colon screening.  No

## 2018-07-17 LAB
25(OH)D3 SERPL-MCNC: 40.7 NG/ML (ref 32–100)
A-G RATIO,AGRAT: 2 RATIO (ref 1.1–2.6)
ALBUMIN SERPL-MCNC: 4.4 G/DL (ref 3.5–5)
ALP SERPL-CCNC: 87 U/L (ref 40–120)
ALT SERPL-CCNC: 28 U/L (ref 5–40)
ANION GAP SERPL CALC-SCNC: 18 MMOL/L
AST SERPL W P-5'-P-CCNC: 29 U/L (ref 10–37)
AVG GLU, 10930: 136 MG/DL (ref 91–123)
BILIRUB SERPL-MCNC: 0.6 MG/DL (ref 0.2–1.2)
BUN SERPL-MCNC: 14 MG/DL (ref 6–22)
CALCIUM SERPL-MCNC: 10 MG/DL (ref 8.4–10.5)
CHLORIDE SERPL-SCNC: 97 MMOL/L (ref 98–110)
CHOLEST SERPL-MCNC: 122 MG/DL (ref 110–200)
CO2 SERPL-SCNC: 24 MMOL/L (ref 20–32)
CREAT SERPL-MCNC: 0.7 MG/DL (ref 0.8–1.4)
GFRAA, 66117: >60
GFRNA, 66118: >60
GLOBULIN,GLOB: 2.2 G/DL (ref 2–4)
GLUCOSE SERPL-MCNC: 105 MG/DL (ref 70–99)
HBA1C MFR BLD HPLC: 6.4 % (ref 4.8–5.9)
HDLC SERPL-MCNC: 2.8 MG/DL (ref 0–5)
HDLC SERPL-MCNC: 44 MG/DL (ref 40–59)
LDLC SERPL CALC-MCNC: 54 MG/DL (ref 50–99)
POTASSIUM SERPL-SCNC: 4 MMOL/L (ref 3.5–5.5)
PROT SERPL-MCNC: 6.6 G/DL (ref 6.2–8.1)
SODIUM SERPL-SCNC: 139 MMOL/L (ref 133–145)
TRIGL SERPL-MCNC: 121 MG/DL (ref 40–149)
TSH SERPL DL<=0.005 MIU/L-ACNC: 2.08 MCU/ML (ref 0.27–4.2)
VLDLC SERPL CALC-MCNC: 24 MG/DL (ref 8–30)

## 2018-08-10 DIAGNOSIS — Z79.4 TYPE 2 DIABETES MELLITUS WITHOUT COMPLICATION, WITH LONG-TERM CURRENT USE OF INSULIN (HCC): ICD-10-CM

## 2018-08-10 DIAGNOSIS — E11.9 TYPE 2 DIABETES MELLITUS WITHOUT COMPLICATION, WITH LONG-TERM CURRENT USE OF INSULIN (HCC): ICD-10-CM

## 2018-08-13 RX ORDER — BLOOD-GLUCOSE METER
KIT MISCELLANEOUS
Qty: 1 KIT | Refills: 0 | Status: SHIPPED | OUTPATIENT
Start: 2018-08-13

## 2019-01-04 DIAGNOSIS — I10 HTN (HYPERTENSION), BENIGN: ICD-10-CM

## 2019-01-04 DIAGNOSIS — E11.9 TYPE 2 DIABETES MELLITUS WITHOUT COMPLICATION, WITH LONG-TERM CURRENT USE OF INSULIN (HCC): ICD-10-CM

## 2019-01-04 DIAGNOSIS — Z79.4 TYPE 2 DIABETES MELLITUS WITHOUT COMPLICATION, WITH LONG-TERM CURRENT USE OF INSULIN (HCC): ICD-10-CM

## 2019-01-04 RX ORDER — METFORMIN HYDROCHLORIDE 500 MG/1
TABLET ORAL
Qty: 60 TAB | Refills: 0 | Status: SHIPPED | OUTPATIENT
Start: 2019-01-04 | End: 2019-01-30 | Stop reason: SDUPTHER

## 2019-01-04 RX ORDER — BISOPROLOL FUMARATE AND HYDROCHLOROTHIAZIDE 5; 6.25 MG/1; MG/1
TABLET ORAL
Qty: 60 TAB | Refills: 0 | Status: SHIPPED | OUTPATIENT
Start: 2019-01-04 | End: 2019-01-30 | Stop reason: SDUPTHER

## 2019-01-30 ENCOUNTER — OFFICE VISIT (OUTPATIENT)
Dept: INTERNAL MEDICINE CLINIC | Age: 69
End: 2019-01-30

## 2019-01-30 VITALS
HEART RATE: 64 BPM | TEMPERATURE: 98.4 F | BODY MASS INDEX: 36.82 KG/M2 | RESPIRATION RATE: 18 BRPM | SYSTOLIC BLOOD PRESSURE: 130 MMHG | DIASTOLIC BLOOD PRESSURE: 77 MMHG | WEIGHT: 221 LBS | OXYGEN SATURATION: 97 % | HEIGHT: 65 IN

## 2019-01-30 DIAGNOSIS — Z79.4 TYPE 2 DIABETES MELLITUS WITHOUT COMPLICATION, WITH LONG-TERM CURRENT USE OF INSULIN (HCC): Primary | ICD-10-CM

## 2019-01-30 DIAGNOSIS — E11.9 TYPE 2 DIABETES MELLITUS WITHOUT COMPLICATION, WITH LONG-TERM CURRENT USE OF INSULIN (HCC): Primary | ICD-10-CM

## 2019-01-30 DIAGNOSIS — I10 HTN (HYPERTENSION), BENIGN: ICD-10-CM

## 2019-01-30 DIAGNOSIS — E78.5 HYPERLIPIDEMIA, UNSPECIFIED HYPERLIPIDEMIA TYPE: ICD-10-CM

## 2019-01-30 DIAGNOSIS — E55.9 VITAMIN D DEFICIENCY: ICD-10-CM

## 2019-01-30 RX ORDER — LOSARTAN POTASSIUM 100 MG/1
TABLET ORAL
Qty: 30 TAB | Refills: 5 | Status: SHIPPED | OUTPATIENT
Start: 2019-01-30 | End: 2019-08-20 | Stop reason: SDUPTHER

## 2019-01-30 RX ORDER — BISOPROLOL FUMARATE AND HYDROCHLOROTHIAZIDE 5; 6.25 MG/1; MG/1
1 TABLET ORAL 2 TIMES DAILY
Qty: 60 TAB | Refills: 5 | Status: SHIPPED | OUTPATIENT
Start: 2019-01-30 | End: 2019-08-20 | Stop reason: SDUPTHER

## 2019-01-30 RX ORDER — METFORMIN HYDROCHLORIDE 500 MG/1
500 TABLET ORAL 2 TIMES DAILY WITH MEALS
Qty: 60 TAB | Refills: 5 | Status: SHIPPED | OUTPATIENT
Start: 2019-01-30 | End: 2019-08-20 | Stop reason: SDUPTHER

## 2019-01-30 RX ORDER — ROSUVASTATIN CALCIUM 20 MG/1
TABLET, COATED ORAL
Qty: 30 TAB | Refills: 5 | Status: SHIPPED | OUTPATIENT
Start: 2019-01-30 | End: 2019-08-20 | Stop reason: SDUPTHER

## 2019-01-30 NOTE — PATIENT INSTRUCTIONS

## 2019-01-30 NOTE — PROGRESS NOTES
HISTORY OF PRESENT ILLNESS  Josue Padilla is a 76 y.o. female. HPI Josue Padilla is here for follow up on diabetes, HTN and hypercholesterolemia. Review of Systems   Constitutional: Negative. HENT: Negative. Eyes: Negative. Respiratory: Negative. Cardiovascular: Negative. Gastrointestinal: Negative. Neurological: Negative. Psychiatric/Behavioral: Negative. Physical Exam   Constitutional: She is oriented to person, place, and time. She appears well-developed and well-nourished. HENT:   Head: Normocephalic and atraumatic. Eyes: Conjunctivae are normal.   Cardiovascular: Normal rate and regular rhythm. Pulmonary/Chest: Effort normal and breath sounds normal.   Musculoskeletal: She exhibits no edema. Neurological: She is alert and oriented to person, place, and time. Psychiatric: She has a normal mood and affect. Her behavior is normal. Judgment and thought content normal.     Visit Vitals  /77 (BP 1 Location: Left arm, BP Patient Position: Sitting)   Pulse 64   Temp 98.4 °F (36.9 °C) (Oral)   Resp 18   Ht 5' 5\" (1.651 m)   Wt 221 lb (100.2 kg)   SpO2 97%   BMI 36.78 kg/m²     Wt Readings from Last 3 Encounters:   01/30/19 221 lb (100.2 kg)   07/16/18 222 lb (100.7 kg)   12/18/17 212 lb (96.2 kg)     Recommend increase exercise, diet and weight reduction    ASSESSMENT and PLAN    ICD-10-CM ICD-9-CM    1. Vitamin D deficiency E55.9 268.9 VITAMIN D, 25 HYDROXY   2. HTN (hypertension), benign I10 401.1 COLLECTION VENOUS BLOOD,VENIPUNCTURE      METABOLIC PANEL, COMPREHENSIVE      bisoprolol-hydroCHLOROthiazide (ZIAC) 5-6.25 mg per tablet      losartan (COZAAR) 100 mg tablet   3. Type 2 diabetes mellitus without complication, with long-term current use of insulin ([facility]) E11.9 250.00 LIPID PANEL    Z79.4 V58.67 HEMOGLOBIN A1C WITH EAG      metFORMIN (GLUCOPHAGE) 500 mg tablet   4.  Hyperlipidemia, unspecified hyperlipidemia type E78.5 272.4 LIPID PANEL      rosuvastatin (CRESTOR) 20 mg tablet     Pt verbalized understanding of their condition and diagnoses, treatment plan,  as well as side effects of any new medications prescribed.

## 2019-01-30 NOTE — PROGRESS NOTES
Chief Complaint   Patient presents with    Diabetes    Cholesterol Problem    Hypertension    Vitamin D Deficiency     1. Have you been to the ER, urgent care clinic since your last visit? Hospitalized since your last visit? No    2. Have you seen or consulted any other health care providers outside of the [address] [place] [address] since your last visit? Include any pap smears or colon screening.  No

## 2019-01-31 LAB
25(OH)D3 SERPL-MCNC: 42.1 NG/ML (ref 32–100)
A-G RATIO,AGRAT: 2.3 RATIO (ref 1.1–2.6)
ALBUMIN SERPL-MCNC: 4.3 G/DL (ref 3.5–5)
ALP SERPL-CCNC: 79 U/L (ref 40–120)
ALT SERPL-CCNC: 37 U/L (ref 5–40)
ANION GAP SERPL CALC-SCNC: 15 MMOL/L
AST SERPL W P-5'-P-CCNC: 37 U/L (ref 10–37)
AVG GLU, 10930: 134 MG/DL (ref 91–123)
BILIRUB SERPL-MCNC: 0.5 MG/DL (ref 0.2–1.2)
BUN SERPL-MCNC: 11 MG/DL (ref 6–22)
CALCIUM SERPL-MCNC: 9.6 MG/DL (ref 8.4–10.5)
CHLORIDE SERPL-SCNC: 97 MMOL/L (ref 98–110)
CHOLEST SERPL-MCNC: 115 MG/DL (ref 110–200)
CO2 SERPL-SCNC: 28 MMOL/L (ref 20–32)
CREAT SERPL-MCNC: 0.6 MG/DL (ref 0.8–1.4)
GFRAA, 66117: >60
GFRNA, 66118: >60
GLOBULIN,GLOB: 1.9 G/DL (ref 2–4)
GLUCOSE SERPL-MCNC: 108 MG/DL (ref 70–99)
HBA1C MFR BLD HPLC: 6.3 % (ref 4.8–5.9)
HDLC SERPL-MCNC: 2.9 MG/DL (ref 0–5)
HDLC SERPL-MCNC: 40 MG/DL (ref 40–59)
LDLC SERPL CALC-MCNC: 54 MG/DL (ref 50–99)
POTASSIUM SERPL-SCNC: 4 MMOL/L (ref 3.5–5.5)
PROT SERPL-MCNC: 6.2 G/DL (ref 6.2–8.1)
SODIUM SERPL-SCNC: 140 MMOL/L (ref 133–145)
TRIGL SERPL-MCNC: 106 MG/DL (ref 40–149)
VLDLC SERPL CALC-MCNC: 21 MG/DL (ref 8–30)

## 2019-08-20 ENCOUNTER — OFFICE VISIT (OUTPATIENT)
Dept: INTERNAL MEDICINE CLINIC | Age: 69
End: 2019-08-20

## 2019-08-20 VITALS
WEIGHT: 224 LBS | TEMPERATURE: 98.2 F | DIASTOLIC BLOOD PRESSURE: 75 MMHG | BODY MASS INDEX: 37.32 KG/M2 | RESPIRATION RATE: 18 BRPM | OXYGEN SATURATION: 97 % | SYSTOLIC BLOOD PRESSURE: 124 MMHG | HEIGHT: 65 IN | HEART RATE: 65 BPM

## 2019-08-20 DIAGNOSIS — E78.5 HYPERLIPIDEMIA, UNSPECIFIED HYPERLIPIDEMIA TYPE: ICD-10-CM

## 2019-08-20 DIAGNOSIS — Z79.4 TYPE 2 DIABETES MELLITUS WITHOUT COMPLICATION, WITH LONG-TERM CURRENT USE OF INSULIN (HCC): ICD-10-CM

## 2019-08-20 DIAGNOSIS — E11.9 TYPE 2 DIABETES MELLITUS WITHOUT COMPLICATION, WITH LONG-TERM CURRENT USE OF INSULIN (HCC): ICD-10-CM

## 2019-08-20 DIAGNOSIS — E55.9 VITAMIN D DEFICIENCY: Primary | ICD-10-CM

## 2019-08-20 DIAGNOSIS — I10 HTN (HYPERTENSION), BENIGN: ICD-10-CM

## 2019-08-20 RX ORDER — LOSARTAN POTASSIUM 100 MG/1
TABLET ORAL
Qty: 30 TAB | Refills: 5 | Status: SHIPPED | OUTPATIENT
Start: 2019-08-20

## 2019-08-20 RX ORDER — ROSUVASTATIN CALCIUM 20 MG/1
TABLET, COATED ORAL
Qty: 30 TAB | Refills: 5 | Status: SHIPPED | OUTPATIENT
Start: 2019-08-20

## 2019-08-20 RX ORDER — METFORMIN HYDROCHLORIDE 500 MG/1
500 TABLET ORAL 2 TIMES DAILY WITH MEALS
Qty: 60 TAB | Refills: 5 | Status: SHIPPED | OUTPATIENT
Start: 2019-08-20

## 2019-08-20 RX ORDER — BISOPROLOL FUMARATE AND HYDROCHLOROTHIAZIDE 5; 6.25 MG/1; MG/1
1 TABLET ORAL 2 TIMES DAILY
Qty: 60 TAB | Refills: 5 | Status: SHIPPED | OUTPATIENT
Start: 2019-08-20

## 2019-08-20 NOTE — PROGRESS NOTES
Chief Complaint   Patient presents with    Diabetes    Cholesterol Problem    Hypertension    Vitamin D Deficiency     1. Have you been to the ER, urgent care clinic since your last visit? Hospitalized since your last visit? No    2. Have you seen or consulted any other health care providers outside of the [address] [place] [address] since your last visit? Include any pap smears or colon screening.  No

## 2019-08-20 NOTE — PATIENT INSTRUCTIONS
Counting Carbohydrates: Care Instructions  Your Care Instructions    You don't have to eat special foods when you have diabetes. You just have to be careful to eat healthy foods. Carbohydrates (carbs) raise blood sugar higher and quicker than any other nutrient. Carbs are found in desserts, breads and cereals, and fruit. They're also in starchy vegetables. These include potatoes, corn, and grains such as rice and pasta. Carbs are also in milk and yogurt. The more carbs you eat at one time, the higher your blood sugar will rise. Spreading carbs all through the day helps keep your blood sugar levels within your target range. Counting carbs is one of the best ways to keep your blood sugar under control. If you use insulin, counting carbs helps you match the right amount of insulin to the number of grams of carbs in a meal. Then you can change your diet and insulin dose as needed. Testing your blood sugar several times a day can help you learn how carbs affect your blood sugar. A registered dietitian or certified diabetes educator can help you plan meals and snacks. Follow-up care is a key part of your treatment and safety. Be sure to make and go to all appointments, and call your doctor if you are having problems. It's also a good idea to know your test results and keep a list of the medicines you take. How can you care for yourself at home? Know your daily amount of carbohydrates  Your daily amount depends on several things, such as your weight, how active you are, which diabetes medicines you take, and what your goals are for your blood sugar levels. A registered dietitian or certified diabetes educator can help you plan how many carbs to include in each meal and snack. For most adults, a guideline for the daily amount of carbs is:  · 45 to 60 grams at each meal. That's about the same as 3 to 4 carbohydrate servings. · 15 to 20 grams at each snack. That's about the same as 1 carbohydrate serving.   Count carbs  Counting carbs lets you know how much rapid-acting insulin to take before you eat. If you use an insulin pump, you get a constant rate of insulin during the day. So the pump must be programmed at meals. This gives you extra insulin to cover the rise in blood sugar after meals. If you take insulin:  · Learn your own insulin-to-carb ratio. You and your diabetes health professional will figure out the ratio. You can do this by testing your blood sugar after meals. For example, you may need a certain amount of insulin for every 15 grams of carbs. · Add up the carb grams in a meal. Then you can figure out how many units of insulin to take based on your insulin-to-carb ratio. · Exercise lowers blood sugar. You can use less insulin than you would if you were not doing exercise. Keep in mind that timing matters. If you exercise within 1 hour after a meal, your body may need less insulin for that meal than it would if you exercised 3 hours after the meal. Test your blood sugar to find out how exercise affects your need for insulin. If you do or don't take insulin:  · Look at labels on packaged foods. This can tell you how many carbs are in a serving. You can also use guides from the American Diabetes Association. · Be aware of portions, or serving sizes. If a package has two servings and you eat the whole package, you need to double the number of grams of carbohydrate listed for one serving. · Protein, fat, and fiber do not raise blood sugar as much as carbs do. If you eat a lot of these nutrients in a meal, your blood sugar will rise more slowly than it would otherwise. Eat from all food groups  · Eat at least three meals a day. · Plan meals to include food from all the food groups. The food groups include grains, fruits, dairy, proteins, and vegetables. · Talk to your dietitian or diabetes educator about ways to add limited amounts of sweets into your meal plan. · If you drink alcohol, talk to your doctor. It may not be recommended when you are taking certain diabetes medicines. Where can you learn more? Go to http://gregoria-tiffanie.info/. Enter Z224 in the search box to learn more about \"Counting Carbohydrates: Care Instructions. \"  Current as of: July 25, 2018  Content Version: 12.1  © 5345-6263 [employer]. Care instructions adapted under license by [employer] (which disclaims liability or warranty for this information). If you have questions about a medical condition or this instruction, always ask your healthcare professional. [address] any warranty or liability for your use of this information.

## 2019-08-20 NOTE — PROGRESS NOTES
HISTORY OF PRESENT ILLNESS  Ancel Bosworth is a 71 y.o. female. HPI Ancel Bosworth is here for follow up on diabetes, HTN and hypercholesterolemia. She does not check her blood sugars. She denies any sx of hypoglycemia. She denies any neuropathy. She has an eye appt next month and appt for mammogram in October. She has no new concerns. Review of Systems   Constitutional: Negative. HENT: Negative. Eyes: Negative for blurred vision. Respiratory: Negative for cough and shortness of breath. Cardiovascular: Negative for chest pain. Gastrointestinal: Negative. Neurological: Negative. Physical Exam   Constitutional: She is oriented to person, place, and time. She appears well-developed and well-nourished. No distress. HENT:   Head: Normocephalic and atraumatic. Cardiovascular: Normal rate and regular rhythm. No murmur heard. Pulmonary/Chest: Effort normal and breath sounds normal.   Musculoskeletal: She exhibits no edema. Neurological: She is alert and oriented to person, place, and time. Psychiatric: She has a normal mood and affect.  Her behavior is normal. Judgment and thought content normal.     Visit Vitals  /75 (BP 1 Location: Left arm, BP Patient Position: Sitting)   Pulse 65   Temp 98.2 °F (36.8 °C) (Oral)   Resp 18   Ht 5' 5\" (1.651 m)   Wt 224 lb (101.6 kg)   SpO2 97%   BMI 37.28 kg/m²     Wt Readings from Last 3 Encounters:   08/20/19 224 lb (101.6 kg)   01/30/19 221 lb (100.2 kg)   07/16/18 222 lb (100.7 kg)     Diabetic foot exam:     Left: Filament test normal sensation with micro filament   Pulse DP: 2+ (normal)   Pulse PT: 2+ (normal)   Deformities: None  Right: Filament test normal sensation with micro filament   Pulse DP: 2+ (normal)   Pulse PT: 2+ (normal)   Deformities: None      ASSESSMENT and PLAN    ICD-10-CM ICD-9-CM    1. Vitamin D deficiency E55.9 268.9 VITAMIN D, 25 HYDROXY      VITAMIN D, 25 HYDROXY   2. HTN (hypertension), benign I10 401.1 bisoprolol-hydroCHLOROthiazide (ZIAC) 5-6.25 mg per tablet      losartan (COZAAR) 100 mg tablet   3. Type 2 diabetes mellitus without complication, with long-term current use of insulin ([facility]) Q78.9 431.91 METABOLIC PANEL, COMPREHENSIVE    Z79.4 V58.67 HEMOGLOBIN A1C WITH EAG      MICROALBUMIN, UR, RAND W/ MICROALB/CREAT RATIO      HM DIABETES FOOT EXAM      metFORMIN (GLUCOPHAGE) 500 mg tablet      MICROALBUMIN, UR, RAND W/ MICROALB/CREAT RATIO      HEMOGLOBIN A1C WITH EAG      METABOLIC PANEL, COMPREHENSIVE   4. Hyperlipidemia, unspecified hyperlipidemia type E78.5 272.4 LIPID PANEL      rosuvastatin (CRESTOR) 20 mg tablet      LIPID PANEL     Pt verbalized understanding of their condition and diagnoses, treatment plan,  as well as side effects of any new medications prescribed.

## 2019-08-21 LAB
25(OH)D3 SERPL-MCNC: 57.1 NG/ML (ref 32–100)
A-G RATIO,AGRAT: 2 RATIO (ref 1.1–2.6)
ALBUMIN SERPL-MCNC: 4.5 G/DL (ref 3.5–5)
ALP SERPL-CCNC: 89 U/L (ref 40–120)
ALT SERPL-CCNC: 38 U/L (ref 5–40)
ANION GAP SERPL CALC-SCNC: 13 MMOL/L
AST SERPL W P-5'-P-CCNC: 43 U/L (ref 10–37)
AVG GLU, 10930: 137 MG/DL (ref 91–123)
BILIRUB SERPL-MCNC: 0.4 MG/DL (ref 0.2–1.2)
BUN SERPL-MCNC: 12 MG/DL (ref 6–22)
CALCIUM SERPL-MCNC: 10.3 MG/DL (ref 8.4–10.5)
CHLORIDE SERPL-SCNC: 97 MMOL/L (ref 98–110)
CHOLEST SERPL-MCNC: 129 MG/DL (ref 110–200)
CO2 SERPL-SCNC: 27 MMOL/L (ref 20–32)
CREAT SERPL-MCNC: 0.7 MG/DL (ref 0.8–1.4)
CREATININE, URINE: <18 MG/DL
GFRAA, 66117: >60
GFRNA, 66118: >60
GLOBULIN,GLOB: 2.3 G/DL (ref 2–4)
GLUCOSE SERPL-MCNC: 117 MG/DL (ref 70–99)
HBA1C MFR BLD HPLC: 6.4 % (ref 4.8–5.9)
HDLC SERPL-MCNC: 2.9 MG/DL (ref 0–5)
HDLC SERPL-MCNC: 45 MG/DL (ref 40–59)
LDLC SERPL CALC-MCNC: 65 MG/DL (ref 50–99)
MICROALB/CREAT RATIO, 140286: NORMAL MCG/MG OF CREATININE (ref 0–30)
MICROALBUMIN,URINE RANDOM 140054: <12 MCG/ML (ref 0.1–17)
POTASSIUM SERPL-SCNC: 4.2 MMOL/L (ref 3.5–5.5)
PROT SERPL-MCNC: 6.8 G/DL (ref 6.2–8.1)
SODIUM SERPL-SCNC: 137 MMOL/L (ref 133–145)
TRIGL SERPL-MCNC: 95 MG/DL (ref 40–149)
VLDLC SERPL CALC-MCNC: 19 MG/DL (ref 8–30)

## 2021-05-19 NOTE — TELEPHONE ENCOUNTER
Called patient she has not received her lab results from the office. She is able to see them online. The B-12 was not able to be added to the draw on 5/7. Did you want the patient to have the b-12 drawn prior to giving the results?   She is due for a visit. If she doesn't have enough meds left until she can schedule a visit, I can send some in.